# Patient Record
Sex: MALE | Race: WHITE | Employment: FULL TIME | URBAN - METROPOLITAN AREA
[De-identification: names, ages, dates, MRNs, and addresses within clinical notes are randomized per-mention and may not be internally consistent; named-entity substitution may affect disease eponyms.]

---

## 2017-06-18 ENCOUNTER — APPOINTMENT (OUTPATIENT)
Dept: GENERAL RADIOLOGY | Age: 47
End: 2017-06-18
Attending: PHYSICIAN ASSISTANT
Payer: COMMERCIAL

## 2017-06-18 ENCOUNTER — HOSPITAL ENCOUNTER (OUTPATIENT)
Age: 47
Discharge: HOME | End: 2017-06-18
Payer: COMMERCIAL

## 2017-06-18 VITALS
BODY MASS INDEX: 27.47 KG/M2 | SYSTOLIC BLOOD PRESSURE: 134 MMHG | TEMPERATURE: 98 F | HEIGHT: 67 IN | RESPIRATION RATE: 16 BRPM | DIASTOLIC BLOOD PRESSURE: 90 MMHG | OXYGEN SATURATION: 98 % | WEIGHT: 175 LBS | HEART RATE: 76 BPM

## 2017-06-18 DIAGNOSIS — S61.219A FINGER LACERATION, INITIAL ENCOUNTER: Primary | ICD-10-CM

## 2017-06-18 PROCEDURE — 12001 RPR S/N/AX/GEN/TRNK 2.5CM/<: CPT

## 2017-06-18 PROCEDURE — 99202 OFFICE O/P NEW SF 15 MIN: CPT

## 2017-06-18 PROCEDURE — 73140 X-RAY EXAM OF FINGER(S): CPT | Performed by: PHYSICIAN ASSISTANT

## 2017-06-18 PROCEDURE — 99203 OFFICE O/P NEW LOW 30 MIN: CPT

## 2017-06-18 NOTE — ED PROVIDER NOTES
[place] [id number] [facility] [place]   Patient presents with:  Laceration Abrasion (integumentary)    Stated Complaint: Finger Lac    HPI    65-year-old male presents to immediate care for evaluation of left index finger laceration Neurological: He is alert. Coordination normal.   Skin: Skin is warm and dry. No erythema. Psychiatric: He has a normal mood and affect.  His behavior is normal. Thought content normal.             ED Course   Labs Reviewed - No data to display  PROCEDURE

## 2017-06-18 NOTE — ED INITIAL ASSESSMENT (HPI)
Patient states he lacerated his left second digit on a  cut off wheel approximately 30 minutes ago. Last tetanus booster was 4-5 years ago.

## 2019-12-05 ENCOUNTER — WALK IN (OUTPATIENT)
Dept: URGENT CARE | Age: 49
End: 2019-12-05

## 2019-12-05 DIAGNOSIS — R00.2 PALPITATIONS: Primary | ICD-10-CM

## 2019-12-05 LAB
DIFFERENTIAL TYPE: ABNORMAL
FERRITIN: 2.64 NG/ML (ref 21.8–274)
HEMATOCRIT: 20.8 % (ref 40–51)
HEMOGLOBIN: 6.7 G/DL (ref 13.7–17.5)
IRON: 17 UG/DL (ref 65–175)
LYMPH PERCENT: 22.5 % (ref 20.5–51.1)
LYMPHOCYTE ABSOLUTE #: 1.5 10*3/UL (ref 1.2–3.4)
Lab: 3 %
MEAN CORPUSCULAR HGB CONCENTRATION: 32.2 % (ref 32–36)
MEAN CORPUSCULAR HGB: 24.5 PG (ref 27–34)
MEAN CORPUSCULAR VOLUME: 76.2 FL (ref 79–95)
MEAN PLATELET VOLUME: 9.3 FL (ref 8.6–12.4)
MIXED %: 8.1 % (ref 4.3–12.9)
MIXED ABSOLUTE #: 0.5 10*3/UL (ref 0.2–0.9)
NEUTROPHIL ABSOLUTE #: 4.7 10*3/UL (ref 1.4–6.5)
NEUTROPHIL PERCENT: 69.4 % (ref 34–73.5)
PLATELET COUNT: 342 10*3/UL (ref 150–400)
RED BLOOD CELL COUNT: 2.73 10*6/UL (ref 3.9–5.7)
RED CELL DISTRIBUTION WIDTH: 18.7 % (ref 11.3–14.8)
RETICULOCYTE %: 3.4 % (ref 0.5–1.5)
TRANSFERRIN: 328 MG/DL (ref 174–364)
WHITE BLOOD CELL COUNT: 6.7 10*3/UL (ref 4–10)

## 2019-12-05 PROCEDURE — 36415 COLL VENOUS BLD VENIPUNCTURE: CPT | Performed by: INTERNAL MEDICINE

## 2019-12-05 PROCEDURE — 99220 INITIAL OBSERVATION CARE,LEVL III: CPT | Performed by: HOSPITALIST

## 2019-12-05 PROCEDURE — 84443 ASSAY THYROID STIM HORMONE: CPT | Performed by: INTERNAL MEDICINE

## 2019-12-05 PROCEDURE — 85025 COMPLETE CBC W/AUTO DIFF WBC: CPT | Performed by: INTERNAL MEDICINE

## 2019-12-05 PROCEDURE — 99203 OFFICE O/P NEW LOW 30 MIN: CPT | Performed by: INTERNAL MEDICINE

## 2019-12-05 PROCEDURE — 93000 ELECTROCARDIOGRAM COMPLETE: CPT | Performed by: INTERNAL MEDICINE

## 2019-12-05 ASSESSMENT — PAIN SCALES - GENERAL: PAINLEVEL: 0

## 2019-12-06 LAB — TSH SERPL DL<=0.05 MIU/L-ACNC: 3.25 M[IU]/L (ref 0.3–4.82)

## 2019-12-06 PROCEDURE — 99253 IP/OBS CNSLTJ NEW/EST LOW 45: CPT | Performed by: INTERNAL MEDICINE

## 2019-12-06 PROCEDURE — 99223 1ST HOSP IP/OBS HIGH 75: CPT | Performed by: HOSPITALIST

## 2019-12-06 ASSESSMENT — ENCOUNTER SYMPTOMS
HEMATOLOGIC/LYMPHATIC NEGATIVE: 1
PSYCHIATRIC NEGATIVE: 1
ALLERGIC/IMMUNOLOGIC NEGATIVE: 1
ENDOCRINE NEGATIVE: 1
SHORTNESS OF BREATH: 1
UNEXPECTED WEIGHT CHANGE: 0
NEUROLOGICAL NEGATIVE: 1
FATIGUE: 1
EYES NEGATIVE: 1
ANAL BLEEDING: 1

## 2019-12-07 ENCOUNTER — EXTERNAL RECORD (OUTPATIENT)
Dept: HEALTH INFORMATION MANAGEMENT | Facility: OTHER | Age: 49
End: 2019-12-07

## 2019-12-07 PROCEDURE — 43239 EGD BIOPSY SINGLE/MULTIPLE: CPT | Performed by: INTERNAL MEDICINE

## 2019-12-07 PROCEDURE — 99239 HOSP IP/OBS DSCHRG MGMT >30: CPT | Performed by: HOSPITALIST

## 2019-12-07 PROCEDURE — 45380 COLONOSCOPY AND BIOPSY: CPT | Performed by: INTERNAL MEDICINE

## 2019-12-09 ENCOUNTER — TELEPHONE (OUTPATIENT)
Dept: GASTROENTEROLOGY | Age: 49
End: 2019-12-09

## 2019-12-09 DIAGNOSIS — K64.8 INTERNAL HEMORRHOIDS: ICD-10-CM

## 2019-12-09 DIAGNOSIS — K62.5 RECTAL BLEEDING: Primary | ICD-10-CM

## 2019-12-12 ENCOUNTER — OFFICE VISIT (OUTPATIENT)
Dept: INTERNAL MEDICINE | Age: 49
End: 2019-12-12

## 2019-12-12 VITALS
WEIGHT: 169.4 LBS | TEMPERATURE: 98.1 F | OXYGEN SATURATION: 99 % | SYSTOLIC BLOOD PRESSURE: 118 MMHG | RESPIRATION RATE: 16 BRPM | BODY MASS INDEX: 25.67 KG/M2 | DIASTOLIC BLOOD PRESSURE: 80 MMHG | HEART RATE: 84 BPM | HEIGHT: 68 IN

## 2019-12-12 DIAGNOSIS — Z00.00 ENCOUNTER FOR GENERAL ADULT MEDICAL EXAMINATION W/O ABNORMAL FINDINGS: Primary | ICD-10-CM

## 2019-12-12 DIAGNOSIS — Z23 ENCOUNTER FOR IMMUNIZATION: ICD-10-CM

## 2019-12-12 DIAGNOSIS — K64.8 INTERNAL HEMORRHOIDS: ICD-10-CM

## 2019-12-12 DIAGNOSIS — D50.0 IRON DEFICIENCY ANEMIA DUE TO CHRONIC BLOOD LOSS: ICD-10-CM

## 2019-12-12 PROCEDURE — 90471 IMMUNIZATION ADMIN: CPT

## 2019-12-12 PROCEDURE — 99213 OFFICE O/P EST LOW 20 MIN: CPT | Performed by: INTERNAL MEDICINE

## 2019-12-12 PROCEDURE — 99396 PREV VISIT EST AGE 40-64: CPT | Performed by: INTERNAL MEDICINE

## 2019-12-12 PROCEDURE — 90715 TDAP VACCINE 7 YRS/> IM: CPT

## 2019-12-12 RX ORDER — FERROUS SULFATE 325(65) MG
325 TABLET ORAL
COMMUNITY
Start: 2019-12-07 | End: 2020-01-06

## 2019-12-12 ASSESSMENT — PATIENT HEALTH QUESTIONNAIRE - PHQ9
2. FEELING DOWN, DEPRESSED OR HOPELESS: NOT AT ALL
SUM OF ALL RESPONSES TO PHQ9 QUESTIONS 1 AND 2: 0
1. LITTLE INTEREST OR PLEASURE IN DOING THINGS: NOT AT ALL
SUM OF ALL RESPONSES TO PHQ9 QUESTIONS 1 AND 2: 0

## 2019-12-14 ENCOUNTER — LAB SERVICES (OUTPATIENT)
Dept: LAB | Age: 49
End: 2019-12-14

## 2019-12-14 DIAGNOSIS — Z00.00 ENCOUNTER FOR GENERAL ADULT MEDICAL EXAMINATION W/O ABNORMAL FINDINGS: ICD-10-CM

## 2019-12-14 LAB
ALBUMIN SERPL-MCNC: 3.9 G/DL (ref 3.6–5.1)
ALP SERPL-CCNC: 67 U/L (ref 45–115)
ALT SERPL W/O P-5'-P-CCNC: 21 U/L (ref 5–49)
AST SERPL-CCNC: 23 U/L (ref 14–43)
BILIRUB SERPL-MCNC: 0.4 MG/DL (ref 0–1.3)
BUN SERPL-MCNC: 14 MG/DL (ref 6–27)
CALCIUM SERPL-MCNC: 9.1 MG/DL (ref 8.6–10.6)
CHLORIDE SERPL-SCNC: 110 MMOL/L (ref 96–107)
CHOLEST SERPL-MCNC: 165 MG/DL (ref 140–200)
CO2 SERPL-SCNC: 29 MMOL/L (ref 22–32)
CREAT SERPL-MCNC: 1.2 MG/DL (ref 0.6–1.6)
EST. AVERAGE GLUCOSE BLD GHB EST-MCNC: 100 MG/DL (ref 0–154)
GFR SERPL CREATININE-BSD FRML MDRD: >60 ML/MIN/{1.73M2}
GFR SERPL CREATININE-BSD FRML MDRD: >60 ML/MIN/{1.73M2}
GLUCOSE P FAST SERPL-MCNC: 101 MG/DL (ref 60–100)
HBA1C MFR BLD: 5.1 % (ref 4.2–6)
HDLC SERPL-MCNC: 36 MG/DL
HEMATOCRIT: 28.5 % (ref 40–51)
HEMOGLOBIN: 8.7 G/DL (ref 13.7–17.5)
LDLC SERPL CALC-MCNC: 107 MG/DL (ref 30–100)
MEAN CORPUSCULAR HGB CONCENTRATION: 30.5 % (ref 32–36)
MEAN CORPUSCULAR HGB: 24.6 PG (ref 27–34)
MEAN CORPUSCULAR VOLUME: 80.5 FL (ref 79–95)
MEAN PLATELET VOLUME: 10.1 FL (ref 8.6–12.4)
PLATELET COUNT: 391 10*3/UL (ref 150–400)
POTASSIUM SERPL-SCNC: 4.8 MMOL/L (ref 3.5–5.3)
PROT SERPL-MCNC: 6.6 G/DL (ref 6.4–8.5)
RED BLOOD CELL COUNT: 3.54 10*6/UL (ref 3.9–5.7)
RED CELL DISTRIBUTION WIDTH: 18.7 % (ref 11.3–14.8)
SODIUM SERPL-SCNC: 140 MMOL/L (ref 136–146)
TRIGL SERPL-MCNC: 112 MG/DL (ref 0–200)
TSH SERPL DL<=0.05 MIU/L-ACNC: 3.51 M[IU]/L (ref 0.3–4.82)
WHITE BLOOD CELL COUNT: 6.3 10*3/UL (ref 4–10)

## 2019-12-14 PROCEDURE — 80061 LIPID PANEL: CPT | Performed by: INTERNAL MEDICINE

## 2019-12-14 PROCEDURE — 84443 ASSAY THYROID STIM HORMONE: CPT | Performed by: INTERNAL MEDICINE

## 2019-12-14 PROCEDURE — 36415 COLL VENOUS BLD VENIPUNCTURE: CPT | Performed by: INTERNAL MEDICINE

## 2019-12-14 PROCEDURE — 80053 COMPREHEN METABOLIC PANEL: CPT | Performed by: INTERNAL MEDICINE

## 2019-12-14 PROCEDURE — 83036 HEMOGLOBIN GLYCOSYLATED A1C: CPT | Performed by: INTERNAL MEDICINE

## 2019-12-14 PROCEDURE — 85027 COMPLETE CBC AUTOMATED: CPT | Performed by: INTERNAL MEDICINE

## 2019-12-20 ENCOUNTER — APPOINTMENT (OUTPATIENT)
Dept: OTHER | Facility: PHYSICIAN GROUP | Age: 49
End: 2019-12-20
Attending: INTERNAL MEDICINE

## 2019-12-20 PROCEDURE — 46930 DESTROY INTERNAL HEMORRHOIDS: CPT | Performed by: INTERNAL MEDICINE

## 2019-12-20 PROCEDURE — 45330 DIAGNOSTIC SIGMOIDOSCOPY: CPT | Performed by: INTERNAL MEDICINE

## 2019-12-31 ENCOUNTER — OFFICE VISIT (OUTPATIENT)
Dept: GASTROENTEROLOGY | Age: 49
End: 2019-12-31

## 2019-12-31 ENCOUNTER — LAB SERVICES (OUTPATIENT)
Dept: LAB | Age: 49
End: 2019-12-31

## 2019-12-31 VITALS — HEIGHT: 67 IN | WEIGHT: 169.8 LBS | BODY MASS INDEX: 26.65 KG/M2

## 2019-12-31 DIAGNOSIS — K64.8 INTERNAL HEMORRHOIDS: ICD-10-CM

## 2019-12-31 DIAGNOSIS — D50.0 IRON DEFICIENCY ANEMIA DUE TO CHRONIC BLOOD LOSS: ICD-10-CM

## 2019-12-31 DIAGNOSIS — K62.5 RECTAL BLEEDING: Primary | ICD-10-CM

## 2019-12-31 LAB
BASOPHIL %: 1.2 % (ref 0–1.2)
BASOPHIL ABSOLUTE #: 0.1 10*3/UL (ref 0–0.1)
DIFFERENTIAL TYPE: ABNORMAL
EOSINOPHIL %: 3.1 % (ref 0–10)
EOSINOPHIL ABSOLUTE #: 0.1 10*3/UL (ref 0–0.5)
HEMATOCRIT: 38.8 % (ref 40–51)
HEMOGLOBIN: 12.2 G/DL (ref 13.7–17.5)
LYMPH PERCENT: 24.4 % (ref 20.5–51.1)
LYMPHOCYTE ABSOLUTE #: 1 10*3/UL (ref 1.2–3.4)
MEAN CORPUSCULAR HGB CONCENTRATION: 31.4 % (ref 32–36)
MEAN CORPUSCULAR HGB: 26.1 PG (ref 27–34)
MEAN CORPUSCULAR VOLUME: 82.9 FL (ref 79–95)
MEAN PLATELET VOLUME: 10.6 FL (ref 8.6–12.4)
MONOCYTE ABSOLUTE #: 0.4 10*3/UL (ref 0.2–0.9)
MONOCYTE PERCENT: 8.5 % (ref 4.3–12.9)
NEUTROPHIL ABSOLUTE #: 2.7 10*3/UL (ref 1.4–6.5)
NEUTROPHIL PERCENT: 62.8 % (ref 34–73.5)
PLATELET COUNT: 355 10*3/UL (ref 150–400)
RED BLOOD CELL COUNT: 4.68 10*6/UL (ref 3.9–5.7)
RED CELL DISTRIBUTION WIDTH: 17.8 % (ref 11.3–14.8)
WHITE BLOOD CELL COUNT: 4.3 10*3/UL (ref 4–10)

## 2019-12-31 PROCEDURE — 36415 COLL VENOUS BLD VENIPUNCTURE: CPT | Performed by: INTERNAL MEDICINE

## 2019-12-31 PROCEDURE — 85025 COMPLETE CBC W/AUTO DIFF WBC: CPT | Performed by: INTERNAL MEDICINE

## 2019-12-31 PROCEDURE — 99024 POSTOP FOLLOW-UP VISIT: CPT | Performed by: INTERNAL MEDICINE

## 2019-12-31 ASSESSMENT — ENCOUNTER SYMPTOMS
BRUISES/BLEEDS EASILY: 0
SPEECH DIFFICULTY: 0
RECTAL PAIN: 0
DIAPHORESIS: 0
SEIZURES: 0
BACK PAIN: 0
CONSTIPATION: 1
ABDOMINAL DISTENTION: 0
COLOR CHANGE: 0
CHEST TIGHTNESS: 0
EYE REDNESS: 0
FATIGUE: 0
CHOKING: 0
ANAL BLEEDING: 0
CHILLS: 0
APPETITE CHANGE: 0
BLOOD IN STOOL: 1
EYE PAIN: 0
CONFUSION: 0
NAUSEA: 0
LIGHT-HEADEDNESS: 0
UNEXPECTED WEIGHT CHANGE: 0
ABDOMINAL PAIN: 0
SORE THROAT: 0
VOMITING: 0
TREMORS: 0
COUGH: 0
DIARRHEA: 0
HEADACHES: 0
SHORTNESS OF BREATH: 0

## 2020-01-13 ENCOUNTER — APPOINTMENT (OUTPATIENT)
Dept: CT IMAGING | Age: 50
End: 2020-01-13
Attending: INTERNAL MEDICINE

## 2021-05-25 VITALS
HEIGHT: 68 IN | SYSTOLIC BLOOD PRESSURE: 130 MMHG | RESPIRATION RATE: 16 BRPM | TEMPERATURE: 97.4 F | WEIGHT: 170 LBS | HEART RATE: 92 BPM | BODY MASS INDEX: 25.76 KG/M2 | OXYGEN SATURATION: 100 % | DIASTOLIC BLOOD PRESSURE: 77 MMHG

## (undated) NOTE — ED AVS SNAPSHOT
[facility] in [id number]    Phone:  [phone]    Fax:  [phone]           Jacquelyn Adrian   MRN: [id number]    Department:  [facility]   Date of Visit:  6/18/2017 from our patient liason soon after your visit. Also, some patients receive a detailed feedback survey mailed to them a week after the visit. If you receive this, we would really appreciate it if you could take the time to complete it. Thank you!       You [facility] [address] (68 [facility] [id number] [address] ([address]) [facility] [address] 176 [facility]. ([address]) [facility] Expires: 8/17/2017 12:11 PM    If you have questions, you can call [phone] to talk to our [facility] Staff. Remember, [email or web address] is NOT to be used for urgent needs. For medical emergencies, dial 911.